# Patient Record
Sex: MALE | Race: OTHER | URBAN - METROPOLITAN AREA
[De-identification: names, ages, dates, MRNs, and addresses within clinical notes are randomized per-mention and may not be internally consistent; named-entity substitution may affect disease eponyms.]

---

## 2022-12-27 ENCOUNTER — NURSE TRIAGE (OUTPATIENT)
Dept: OTHER | Age: 37
End: 2022-12-27

## 2022-12-27 NOTE — TELEPHONE ENCOUNTER
Reason for Call/Symptoms: Blood in stool  Onset: On and off for 1 year   Courtesy Assessment: Very minimal amount of blood in stool   Level of care recommendation: Office visit     Future Appointments   Date Time Provider Delvin Ray   12/31/2022  8:00 AM Kathryn Conte DO [facility]       Advice given to patient: If more then minimal amount of stool ICC or ER sooner.